# Patient Record
Sex: FEMALE | Race: WHITE | NOT HISPANIC OR LATINO | Employment: OTHER | URBAN - METROPOLITAN AREA
[De-identification: names, ages, dates, MRNs, and addresses within clinical notes are randomized per-mention and may not be internally consistent; named-entity substitution may affect disease eponyms.]

---

## 2017-01-03 ENCOUNTER — OFFICE VISIT (OUTPATIENT)
Dept: MATERNAL FETAL MEDICINE | Facility: CLINIC | Age: 27
End: 2017-01-03
Payer: OTHER GOVERNMENT

## 2017-01-03 DIAGNOSIS — Z36.89 ENCOUNTER FOR ULTRASOUND TO CHECK FETAL GROWTH: ICD-10-CM

## 2017-01-03 PROCEDURE — 99212 OFFICE O/P EST SF 10 MIN: CPT | Mod: 25,GT,S$PBB, | Performed by: OBSTETRICS & GYNECOLOGY

## 2017-01-03 PROCEDURE — 76816 OB US FOLLOW-UP PER FETUS: CPT | Mod: 26,S$PBB,, | Performed by: OBSTETRICS & GYNECOLOGY

## 2017-01-03 PROCEDURE — 76817 TRANSVAGINAL US OBSTETRIC: CPT | Mod: 26,S$PBB,, | Performed by: OBSTETRICS & GYNECOLOGY

## 2017-01-03 NOTE — PROGRESS NOTES
Indication: follow up growth/cervix (Dr. Romi Baer).   Recurrent  delivery.   ____________________________________________________________________________  History: Age: 26 years. : 3 Para: 2. Previous pregnancies: Children born : 2. Living children: 2.   Obstetric History: Mode of last delivery: Vaginal Delivery.    Details on previous pregnancies: Living children <37W: 2.  ____________________________________________________________________________  Dating:  Best Overall Assessment: 10/10/16 EDC: 17 Assessed GA: 25w1d  The Best Overall Assessment is based on the ultrasound examination on 10/10/16.  The calculation of the gestational age was then based on  CRL.  ____________________________________________________________________________  General Evaluation:  Fetal heart activity: present. Fetal heart rate: 152 bpm.   Presentation: cephalic.   Fetal movement: visible.   Amniotic Fluid: normal. Maximal vertical pocket 7.3 cm.   Cord: 3 vessels.   Placenta: posterior.     ____________________________________________________________________________  Growth Scan:  [place]ahan gestation.  Biometry:  BPD 62.9 mm 54th% 25w3d (24w5d to 26w2d)  .0 mm 35th% 25w2d (23w2d to 27w3d)  .0 mm 60th% 25w5d (25w0d to 26w3d)  FL 44.9 mm 27th% 24w6d (22w5d to 26w6d)  OFD 80.8 mm 39th% 24w6d  TCD 27.9 mm 63rd% 25w4d  HUM 42.3 mm 50th% 25w1d  RLV 5.7 mm  CM 6.6 mm 64th%  EFW (lbs/oz) 1 lbs 12 ozs  EFW (g) 799 g  48th%       Fetal Anatomy:  Visualized with normal appearance: head, brain, chest, gastrointestinal tract, kidneys, bladder.  Not examined: neck, skin.  Face: profile normal, nose normal, lip normal.  Spine: sub-opt today but prev seen wnl.  Heart: Visualized and normal appearance: four-chamber view, left outflow tract, right outflow tract.   Angle: to the fetal left. Four-chamber view: septum is sub-opt, prev seen wnl. Aortic arch: Not ascertained.   Aorta suboptimal but prev seen  "wnl.  Abdominal Wall: Sub-opt today but prev seen wnl.  Genitalia: sub-opt today but prev seen girl.   Extremities: 4 limbs. Sub-opt today on plantar surface of the feet, prev seen wnl; open hands seen.    Summary of Ultrasound Findings:  Transabdominal and transvaginal US. U/S machine: PhilipsEPNSC 7W. U/S view: limited by fetal position, limited by fetal movements.     ____________________________________________________________________________  Consultation:  FUV: Recurrent PTD     26  ARVIN 17; 24w2d    Prenatal record and OB Hx reviewed  Dr. Hopper's last note reviewed  Maternal Screen wnl  Pt interviewed and examined  Ultrasound done  Interval pregnancy problems - feeling "more and more ctxs"  No leaking, bleeding or abnormal discharge  on weekly 17-P    EPIC reviewed    OB HX   - Faviola; 4-13;  at 34w; NICU x 6w;   pt had bleeding and multiple admissions; reports CL was 4 mm and pt was 6 cm the day before delivery   - Melanie; 5-6;  at 36 weeks; was on Vaginal progesterone; pt reports "ctxs at 12 weeks" and CL around 12 mm at 24 weeks; she was later induced after P-PROM; 9 hour labor  She had P-PROM "1-2 days after stopping progesterone"    Thyroid Disorder  Pt reports "hypothyroidism" after last delivery  Was not treated.  ____________________________________________________________________________  Maternal Structures:  Cervix: no funneling.   Cervical length: 26.1 mm  Cervical length with fundal pressure: 27 mm.  ____________________________________________________________________________  Report Summary:  Impression:   Normal fetal growth,   Normal Amniotic fluid volume,   Normal placental location,   Cervical length is 26 mm but still wnl  This couple are anxious about "delivering as soon as they stop progesterone" and would like to continue their weekly 17-P until 37 weeks.     Recommendations:   Consider more frequent prenatal visits, to assess for signs and symptoms of " PTL  Advised pt to remain well rested, well nourished and well hydrated  We have no objection to an extra week of 17-P  Re-consult if clinical condition changes

## 2017-03-13 ENCOUNTER — HOSPITAL ENCOUNTER (INPATIENT)
Facility: OTHER | Age: 27
LOS: 8 days | Discharge: HOME | End: 2017-03-21
Attending: OBSTETRICS & GYNECOLOGY | Admitting: OBSTETRICS & GYNECOLOGY
Payer: OTHER GOVERNMENT

## 2017-03-13 DIAGNOSIS — O47.03 THREATENED PRETERM LABOR, THIRD TRIMESTER: Primary | ICD-10-CM

## 2017-03-13 PROBLEM — O47.00 THREATENED PRETERM LABOR: Status: ACTIVE | Noted: 2017-03-13

## 2017-03-13 LAB
ABO + RH BLD: NORMAL
BASOPHILS # BLD AUTO: 0.01 K/UL
BASOPHILS NFR BLD: 0.1 %
BLD GP AB SCN CELLS X3 SERPL QL: NORMAL
DIFFERENTIAL METHOD: ABNORMAL
EOSINOPHIL # BLD AUTO: 0.2 K/UL
EOSINOPHIL NFR BLD: 1.2 %
ERYTHROCYTE [DISTWIDTH] IN BLOOD BY AUTOMATED COUNT: 12.6 %
HCT VFR BLD AUTO: 34.2 %
HGB BLD-MCNC: 10.9 G/DL
LYMPHOCYTES # BLD AUTO: 1.8 K/UL
LYMPHOCYTES NFR BLD: 11.3 %
MCH RBC QN AUTO: 28.9 PG
MCHC RBC AUTO-ENTMCNC: 31.9 %
MCV RBC AUTO: 91 FL
MONOCYTES # BLD AUTO: 1 K/UL
MONOCYTES NFR BLD: 6.6 %
NEUTROPHILS # BLD AUTO: 12.6 K/UL
NEUTROPHILS NFR BLD: 80.2 %
PLATELET # BLD AUTO: 278 K/UL
PMV BLD AUTO: 9.1 FL
RBC # BLD AUTO: 3.77 M/UL
WBC # BLD AUTO: 15.67 K/UL

## 2017-03-13 PROCEDURE — 99223 1ST HOSP IP/OBS HIGH 75: CPT | Mod: 25,,, | Performed by: OBSTETRICS & GYNECOLOGY

## 2017-03-13 PROCEDURE — 59025 FETAL NON-STRESS TEST: CPT | Mod: 26,,, | Performed by: OBSTETRICS & GYNECOLOGY

## 2017-03-13 PROCEDURE — 25000003 PHARM REV CODE 250: Performed by: STUDENT IN AN ORGANIZED HEALTH CARE EDUCATION/TRAINING PROGRAM

## 2017-03-13 PROCEDURE — 99285 EMERGENCY DEPT VISIT HI MDM: CPT | Mod: 25

## 2017-03-13 PROCEDURE — 59025 FETAL NON-STRESS TEST: CPT

## 2017-03-13 PROCEDURE — 99283 EMERGENCY DEPT VISIT LOW MDM: CPT | Mod: 25,,, | Performed by: OBSTETRICS & GYNECOLOGY

## 2017-03-13 PROCEDURE — 76816 OB US FOLLOW-UP PER FETUS: CPT | Mod: 26,,, | Performed by: OBSTETRICS & GYNECOLOGY

## 2017-03-13 PROCEDURE — 86850 RBC ANTIBODY SCREEN: CPT

## 2017-03-13 PROCEDURE — 11000001 HC ACUTE MED/SURG PRIVATE ROOM

## 2017-03-13 PROCEDURE — 85025 COMPLETE CBC W/AUTO DIFF WBC: CPT

## 2017-03-13 PROCEDURE — 86900 BLOOD TYPING SEROLOGIC ABO: CPT

## 2017-03-13 RX ORDER — SIMETHICONE 80 MG
1 TABLET,CHEWABLE ORAL EVERY 6 HOURS PRN
Status: DISCONTINUED | OUTPATIENT
Start: 2017-03-13 | End: 2017-03-21 | Stop reason: HOSPADM

## 2017-03-13 RX ORDER — PRENATAL WITH FERROUS FUM AND FOLIC ACID 3080; 920; 120; 400; 22; 1.84; 3; 20; 10; 1; 12; 200; 27; 25; 2 [IU]/1; [IU]/1; MG/1; [IU]/1; MG/1; MG/1; MG/1; MG/1; MG/1; MG/1; UG/1; MG/1; MG/1; MG/1; MG/1
1 TABLET ORAL DAILY
Status: DISCONTINUED | OUTPATIENT
Start: 2017-03-14 | End: 2017-03-21 | Stop reason: HOSPADM

## 2017-03-13 RX ORDER — DIPHENHYDRAMINE HCL 25 MG
25 CAPSULE ORAL EVERY 4 HOURS PRN
Status: DISCONTINUED | OUTPATIENT
Start: 2017-03-13 | End: 2017-03-21 | Stop reason: HOSPADM

## 2017-03-13 RX ORDER — AMOXICILLIN 250 MG
1 CAPSULE ORAL NIGHTLY PRN
Status: DISCONTINUED | OUTPATIENT
Start: 2017-03-13 | End: 2017-03-21 | Stop reason: HOSPADM

## 2017-03-13 RX ORDER — DIPHENHYDRAMINE HYDROCHLORIDE 50 MG/ML
25 INJECTION INTRAMUSCULAR; INTRAVENOUS EVERY 4 HOURS PRN
Status: DISCONTINUED | OUTPATIENT
Start: 2017-03-13 | End: 2017-03-21 | Stop reason: HOSPADM

## 2017-03-13 RX ORDER — SODIUM CHLORIDE, SODIUM LACTATE, POTASSIUM CHLORIDE, CALCIUM CHLORIDE 600; 310; 30; 20 MG/100ML; MG/100ML; MG/100ML; MG/100ML
INJECTION, SOLUTION INTRAVENOUS CONTINUOUS
Status: DISCONTINUED | OUTPATIENT
Start: 2017-03-13 | End: 2017-03-14

## 2017-03-13 RX ORDER — ONDANSETRON 8 MG/1
8 TABLET, ORALLY DISINTEGRATING ORAL EVERY 8 HOURS PRN
Status: DISCONTINUED | OUTPATIENT
Start: 2017-03-13 | End: 2017-03-21 | Stop reason: HOSPADM

## 2017-03-13 RX ADMIN — SODIUM CHLORIDE, SODIUM LACTATE, POTASSIUM CHLORIDE, AND CALCIUM CHLORIDE: .6; .31; .03; .02 INJECTION, SOLUTION INTRAVENOUS at 05:03

## 2017-03-13 NOTE — IP AVS SNAPSHOT
[facility]  [address] [place], [place] [address]  Phone: [phone]           Patient Discharge Instructions     Our goal is to set you up for success. This packet includes information on your condition, medications, and your home care. It will help you to care for yourself so you don't get sicker and need to go back to the hospital.     Please ask your nurse if you have any questions.        There are many details to remember when preparing to leave the hospital. Here is what you will need to do:    1. Take your medicine. If you are prescribed medications, review your Medication List in the following pages. You may have new medications to  at the pharmacy and others that you'll need to stop taking. Review the instructions for how and when to take your medications. Talk with your doctor or nurses if you are unsure of what to do.     2. Go to your follow-up appointments. Specific follow-up information is listed in the following pages. Your may be contacted by a transition nurse or clinical provider about future appointments. Be sure we have all of the phone numbers to reach you, if needed. Please contact your provider's office if you are unable to make an appointment.     3. Watch for warning signs. Your doctor or nurse will give you detailed warning signs to watch for and when to call for assistance. These instructions may also include educational information about your condition. If you experience any of warning signs to your health, call your doctor.               [facility]  Unless otherwise directed by your provider, please contact [facility], our nurse care line that is available for 24/7 assistance.     [phone] (toll-free)    Registered nurses in the [facility] provide clinical advisement, health education, appointment booking, and other advisory services.                    ** Verify the list of medication(s) below is accurate and up to  date. Carry this with you in case of emergency. If your medications have changed, please notify your healthcare provider.             Medication List      CONTINUE taking these medications        Additional Info                      ondansetron 4 MG tablet   Commonly known as:  ZOFRAN   Refills:  0      Begin Date    AM    Noon    PM    Bedtime       PRENATAL MULTIVITAMINS ORAL   Refills:  0   Dose:  1 capsule    Instructions:  Take 1 capsule by mouth.     Begin Date    AM    Noon    PM    Bedtime       UNISOM (DOXYLAMINE) 25 mg tablet   Refills:  0   Dose:  1 capsule   Generic drug:  doxylamine succinate    Instructions:  Take 1 capsule by mouth daily as needed.     Begin Date    AM    Noon    PM    Bedtime       VITAMIN B-6 50 MG Tab   Refills:  0   Generic drug:  pyridoxine (vitamin B6)      Begin Date    AM    Noon    PM    Bedtime                  Please bring to all follow up appointments:    1. A copy of your discharge instructions.  2. All medicines you are currently taking in their original bottles.  3. Identification and insurance card.    Please arrive 15 minutes ahead of scheduled appointment time.    Please call 24 hours in advance if you must reschedule your appointment and/or time.        Follow-up Information     Follow up with [facility]. Schedule an appointment as soon as possible for a visit in 1 week.    Why:  Prenatal appointments    Contact information:    [address] [facility] [place] [address] [phone]          Discharge Instructions     Future Orders    Activity as tolerated     Call MD for:  difficulty breathing or increased cough     Call MD for:  persistent dizziness, light-headedness, or visual disturbances     Call MD for:  persistent nausea and vomiting or diarrhea     Call MD for:  redness, tenderness, or signs of infection (pain, swelling, redness, odor or green/yellow discharge around incision site)     Call MD for:  severe persistent headache     Call MD for:   "severe uncontrolled pain     Call MD for:  temperature >100.4     Diet general     Questions:    Total calories:      Fat restriction, if any:      Protein restriction, if any:      Na restriction, if any:      Fluid restriction:      Additional restrictions:          Primary Diagnosis     Your primary diagnosis was:  Threatened Premature Labor      Admission Information     Date & Time Provider Department CSN    3/13/2017  3:53 PM Mary Colorado MD [facility] [id number]      Care Providers     Provider Role Specialty Primary office phone    Mary Colorado MD Attending Provider Maternal and Fetal Medicine [phone]      Your Vitals Were     BP Pulse Temp Resp Height Weight    106/70 (BP Location: Right arm, Patient Position: Sitting, BP Method: Automatic) 91 96.6 °F (35.9 °C) 18 5' 4" (1.626 m) 67.2 kg (148 lb 1.7 oz)    SpO2 BMI             95% 25.42 kg/m2         Recent Lab Values     No lab values to display.      Allergies as of 3/21/2017        Reactions    Chlorhexidine Rash    Latex, Natural Rubber Rash    Morphine Rash      Advance Directives     An advance directive is a document which, in the event you are no longer able to make decisions for yourself, tells your healthcare team what kind of treatment you do or do not want to receive, or who you would like to make those decisions for you.  If you do not currently have an advance directive, [facility] encourages you to create one.  For more information call:  [phone]), [phone] ([phone]),  or log on to [email or web address].        Language Assistance Services     ATTENTION: Language assistance services are available, free of charge. Please call [phone].      ATENCIÓN: Si habla español, tiene a rivera disposición servicios gratuitos de asistencia lingüística. Llame al [phone].     DAVE Ý: N?u b?n nói Ti?ng Vi?t, có các d?ch v? h? tr? ngôn ng? mi?n phí dành cho b?n. G?i s? [phone].      "   [facility] Sign-Up     Activating your [facility] account is as easy as 1-2-3!     1) Visit [email or web address], select Sign Up Now, enter this activation code and your date of birth, then select Next.  [email or web address]  Expires: 5/5/2017 10:02 AM      2) Create a username and password to use when you visit [facility] in the future and select a security question in case you lose your password and select Next.    3) Enter your e-mail address and click Sign Up!    Additional Information  If you have questions, please e-mail [email or web address] or call [phone] to talk to our [facility] staff. Remember, [facility] is NOT to be used for urgent needs. For medical emergencies, dial 911.          [facility] complies with applicable Federal civil rights laws and does not discriminate on the basis of race, color, national origin, age, disability, or sex.

## 2017-03-13 NOTE — LETTER
March 21, 2017    Abby Oneill  [address]  [place] [address]            [address] [facility] [address]  Phone: [phone] March 21, 2017     Patient: Abby Oneill   YOB: 1990   Date of Visit: 3/13/2017       To Whom It May Concern:    It is my medical opinion that Abby Oneill should remain out of work for the remainder of her pregnancy.  Her advanced cervical dilation of 6cm and history of rapid labor upon rupture of membranes does not make her a candidate for resuming work duties.    If you have any questions or concerns, please don't hesitate to call.    Sincerely,            Joey Yun MD

## 2017-03-13 NOTE — PROGRESS NOTES
Pt brought from [facility] to antepartum for continuous monitoring. Pt reports +FM & denies LOF. She is feeling ctx every 5 minutes. No cervical change during transfer 4/60/-3. Magnesium was stopped upon arrival, tay removed. VSS.

## 2017-03-13 NOTE — H&P
HPI Comments: Abby Oneill is a 26 y.o. [id number] at 3wks gestation (ARVIN = 2017 per 13 wk gestation) presents complaining of contractions that began early this morning. States she initially believed they were [place] Campos ctx but they have become more intense and more frequent. She was seen by her primary OB this morning where she was noted to be 3cm dilated, per patient. She was sent to L&D at her hospital at which time she was noted to be 4/50/-1 and ken regularly on toco. OSH started magnesium bolus and initiated transfer to [facility]. She received BMZ series 3/7, 3/8.   This IUP is complicated by h/o PTD x 2 (34w2d, 36w1d) on weekly progesterone and hypothyroidism (no medication). Patient denies vaginal bleeding, denies LOF. Fetal Movement: normal.          Past Medical History:   Diagnosis Date    H/O hemorrhoidectomy     Hypothyroid              Past Surgical History:   Procedure Laterality Date    INGUINAL HERNIA REPAIR         No family history on file.  Social History   Substance Use Topics    Smoking status: Never Smoker    Smokeless tobacco: Never Used    Alcohol use No      Review of Systems   Constitutional: Negative for chills and fever.   HENT: Negative for congestion.   Respiratory: Negative for shortness of breath.   Gastrointestinal: Negative for nausea and vomiting.   Genitourinary: Negative for vaginal bleeding.   Skin: Negative for wound.   Neurological: Negative for headaches.                 Physical Exam          Initial Vitals   BP Pulse Resp Temp SpO2   -- -- -- -- --    115/69 87  16  97.3  99%       Physical Exam  Constitutional: She appears well-developed and well-nourished.   Eyes: EOM are normal.   Neck: Normal range of motion. Neck supple.   Cardiovascular: Normal rate and regular rhythm.   Pulmonary/Chest: No respiratory distress.   Abdominal: Soft. She exhibits no distension. There is no tenderness.   Musculoskeletal: Normal range of motion. She  exhibits no edema.   Neurological: She is alert and oriented to person, place, and time.   OB LABOR EXAM:      Membranes ruptured: No.   Method: Sterile vaginal exam per MD.         Dilatation: 4.   Station: -2.   Amniotic Fluid Color: no fluid.       Olguin score: 7     ED Course   Procedures  Labs Reviewed - No data to display                             ED Course      Clinical Impression:   The encounter diagnosis was Threatened  labor, third trimester.      - admit to antepartum  - consents for blood transfusion, c/s, and vaginal delivery signed to chart  - draw type and screen, CBC  - GBS negative  - cephalic position  - s/p BMZ for fetal lung maturity  - continuous monitoring/toco  - NPO  - recheck prn    Casie Conde MD  OB/GYN, PGY-1

## 2017-03-13 NOTE — ED PROVIDER NOTES
Encounter Date: 3/13/2017       History     Chief Complaint   Patient presents with    Contractions     Review of patient's allergies indicates:   Allergen Reactions    Chlorhexidine Rash    Latex, natural rubber Rash    Morphine Rash     HPI Comments: Abby Oneill is a 26 y.o. [id number] at 34w1d presents complaining of contractions that began early this morning. States she initially believed they were [place] Campos ctx but they have become more intense and more frequent. She was seen by her primary OB this morning where she was noted to be 3cm dilated, per patient. She was sent to L&D at her hospital at which time she was noted to be 4/50/-1 and ken regularly on toco. OSH started magnesium bolus and initiated transfer to [facility]. She received BMZ series 3/7, 3/8.   This IUP is complicated by h/o PTD x 2 (34w2d, 36w1d) on weekly progesterone and hypothyroidism (no medication).  Patient denies vaginal bleeding, denies LOF.   Fetal Movement: normal.      Past Medical History:   Diagnosis Date    H/O hemorrhoidectomy     Hypothyroid      Past Surgical History:   Procedure Laterality Date    INGUINAL HERNIA REPAIR       No family history on file.  Social History   Substance Use Topics    Smoking status: Never Smoker    Smokeless tobacco: Never Used    Alcohol use No     Review of Systems   Constitutional: Negative for chills and fever.   HENT: Negative for congestion.    Respiratory: Negative for shortness of breath.    Gastrointestinal: Negative for nausea and vomiting.   Genitourinary: Negative for vaginal bleeding.   Skin: Negative for wound.   Neurological: Negative for headaches.       Physical Exam   Initial Vitals   BP Pulse Resp Temp SpO2   -- -- -- -- --            Physical Exam    Constitutional: She appears well-developed and well-nourished.   Eyes: EOM are normal.   Neck: Normal range of motion. Neck supple.   Cardiovascular: Normal rate and regular rhythm.   Pulmonary/Chest:  No respiratory distress.   Abdominal: Soft. She exhibits no distension. There is no tenderness.   Musculoskeletal: Normal range of motion. She exhibits no edema.   Neurological: She is alert and oriented to person, place, and time.     OB LABOR EXAM:     Membranes ruptured: No.   Method: Sterile vaginal exam per MD.       Dilatation: 4.   Station: -2.   Amniotic Fluid Color: no fluid.           ED Course   Procedures  Labs Reviewed - No data to display                       Attending Attestation:   Physician Attestation Statement for Resident:  As the supervising MD   Physician Attestation Statement: I have personally seen and examined this patient.   I agree with the above history. -:   As the supervising MD I agree with the above PE.    As the supervising MD I agree with the above treatment, course, plan, and disposition.   -:   NST  I independently reviewed the fetal non-stress test with the following interpretation:  135 BPM baseline  Variability: moderate  Accelerations: present  Decelerations: absent  Contractions: Q 5-7 min  Category 1    Clinical Interpretation: reactive    Patient evaluated and found to be stable, agree with resident's assessment and plan to admit for management of pre-term labor.  I was personally present during the critical portions of the procedure(s) performed by the resident and was immediately available in the ED to provide services and assistance as needed during the entire procedure.  I have reviewed the following: old records at this facility.                    ED Course     Clinical Impression:   The encounter diagnosis was Threatened  labor, third trimester.     - admit to antepartum  - consents for blood transfusion, c/s, and vaginal delivery signed to chart  - draw type and screen, CBC  - GBS negative  - cephalic position  - s/p BMZ for fetal lung maturity  - continuous monitoring/toco  - NPO  - recheck na Conde MD  Resident  [phone]        Rose Marie Jones MD  03/20/17 [id number]

## 2017-03-14 PROCEDURE — 25000003 PHARM REV CODE 250: Performed by: STUDENT IN AN ORGANIZED HEALTH CARE EDUCATION/TRAINING PROGRAM

## 2017-03-14 PROCEDURE — 59025 FETAL NON-STRESS TEST: CPT

## 2017-03-14 PROCEDURE — 99232 SBSQ HOSP IP/OBS MODERATE 35: CPT | Mod: 25,,, | Performed by: OBSTETRICS & GYNECOLOGY

## 2017-03-14 PROCEDURE — 11000001 HC ACUTE MED/SURG PRIVATE ROOM

## 2017-03-14 PROCEDURE — 59025 FETAL NON-STRESS TEST: CPT | Mod: 26,,, | Performed by: OBSTETRICS & GYNECOLOGY

## 2017-03-14 RX ADMIN — SODIUM CHLORIDE, SODIUM LACTATE, POTASSIUM CHLORIDE, AND CALCIUM CHLORIDE: .6; .31; .03; .02 INJECTION, SOLUTION INTRAVENOUS at 01:03

## 2017-03-14 RX ADMIN — Medication 1 EACH: at 09:03

## 2017-03-14 NOTE — ASSESSMENT & PLAN NOTE
-Cervix unchanged from yesterday evening.  Contractions spacing out.  Will plan to deescalate today and continue to monitor  -S/p BMZ 3/7-3/8

## 2017-03-14 NOTE — PROGRESS NOTES
[facility]  Maternal & Fetal Medicine  Progress Note    Patient Name: Abby Oneill  MRN: [id number]  Code Status: Full Code   Admission Date: 3/13/2017  Length of Stay: 1 days  Attending Physician: Mary Colorado MD  Primary Care Provider: [facility]    Subjective:     HPI:  Abby Oneill is a 26 y.o. [id number] at 34w2d gestation (ARVIN = 2017 per 13 wk gestation) who presented complaining of contractions that began early yesterday morning. States she initially believed they were [place] Campos ctx but they have become more intense and more frequent. She was seen by her primary OB yesterday morning where she was noted to be 3cm dilated, per patient. She was sent to L&D at her hospital at which time she was noted to be 4/50/-1 and ken regularly on toco. OSH started magnesium bolus and initiated transfer to [facility]. She received BMZ series 3/7, 3/8.   This IUP is complicated by h/o PTD x 2 (34w2d, 36w1d) on weekly progesterone and hypothyroidism (no medication).      Interval History: This morning the patient reports feeling less frequent contractions than at admit.  Denies LOF and VB.  Good FM.    Review of patient's allergies indicates:   Allergen Reactions    Chlorhexidine Rash    Latex, natural rubber Rash    Morphine Rash       Objective:     Vital Signs (Most Recent):  Temp: 97.2 °F (36.2 °C) (17 0430)  Pulse: 99 (17 0620)  Resp: 14 (17 0430)  BP: 113/65 (17 0620)  SpO2: 98 % (17 0620) Vital Signs (24h Range):  Temp:  [97.2 °F (36.2 °C)-98 °F (36.7 °C)] 97.2 °F (36.2 °C)  Pulse:  [] 99  Resp:  [14-18] 14  SpO2:  [96 %-100 %] 98 %  BP: ()/(56-69) 113/65       Intake/Output Summary (Last 24 hours) at 17 0928  Last data filed at 17 1720   Gross per 24 hour   Intake                0 ml   Output             1200 ml   Net            -1200 ml       Physical Exam:   Constitutional: She appears well-developed and  well-nourished.    HENT:   Head: Normocephalic and atraumatic.    Eyes: Conjunctivae are normal.     Cardiovascular: Normal heart sounds.     Pulmonary/Chest: Effort normal.        Abdominal: Soft. She exhibits no distension and no mass. There is no tenderness. There is no rebound and no guarding. No hernia.     Genitourinary:   Genitourinary Comments: /-2 (unchanged from last night)                Skin: Skin is warm and dry.        FHT/NST: 135, mod btbv, +accels/-decels Cat 1 (reassuring)                 TOCO: Q 5-10 minutes       Significant Labs: No results found for this or any previous visit (from the past 12 hour(s)).       Assessment/Plan:   34w2d weeks gestation presents for:    Threatened  labor  -Cervix unchanged from yesterday evening.  Contractions spacing out.  Will plan to deescalate today and continue to monitor  -S/p BMZ 3/7-3/8      Joey Yun MD  Maternal & Fetal Medicine  [facility]

## 2017-03-14 NOTE — SUBJECTIVE & OBJECTIVE
Interval History: This morning the patient reports feeling less frequent contractions than at admit.  Denies LOF and VB.  Good FM.    Review of patient's allergies indicates:   Allergen Reactions    Chlorhexidine Rash    Latex, natural rubber Rash    Morphine Rash       Objective:     Vital Signs (Most Recent):  Temp: 97.2 °F (36.2 °C) (03/14/17 0430)  Pulse: 99 (03/14/17 0620)  Resp: 14 (03/14/17 0430)  BP: 113/65 (03/14/17 0620)  SpO2: 98 % (03/14/17 0620) Vital Signs (24h Range):  Temp:  [97.2 °F (36.2 °C)-98 °F (36.7 °C)] 97.2 °F (36.2 °C)  Pulse:  [] 99  Resp:  [14-18] 14  SpO2:  [96 %-100 %] 98 %  BP: ()/(56-69) 113/65       Intake/Output Summary (Last 24 hours) at 03/14/17 0928  Last data filed at 03/13/17 1720   Gross per 24 hour   Intake                0 ml   Output             1200 ml   Net            -1200 ml       Physical Exam:   Constitutional: She appears well-developed and well-nourished.    HENT:   Head: Normocephalic and atraumatic.    Eyes: Conjunctivae are normal.     Cardiovascular: Normal heart sounds.     Pulmonary/Chest: Effort normal.        Abdominal: Soft. She exhibits no distension and no mass. There is no tenderness. There is no rebound and no guarding. No hernia.     Genitourinary:   Genitourinary Comments: 5/70/-2 (unchanged from last night)                Skin: Skin is warm and dry.        FHT/NST: 135, mod btbv, +accels/-decels Cat 1 (reassuring)                 TOCO: Q 5-10 minutes       Significant Labs: No results found for this or any previous visit (from the past 12 hour(s)).

## 2017-03-14 NOTE — PROGRESS NOTES
MD to bedside for c/o increased pain/intensity with contractions.     FHT: 135 bpm, Cat 1, reassuring  TOCO: q7-10 mins    SVE: 5/70/-2    27 yo  with threatened  labor  - cervical change noted on exam  - tylenol offered, patient declined. Notes that she would like epidural with this delivery if possible.  - given patient's history of rapid progression of labor and delivery, will move to L&D floor. Charge RN notified.  - NPO/continuous  - recheck PRN, reasons to call out reviewed    Dominique Sky MD  OBGYN - PGY 2

## 2017-03-15 PROCEDURE — 11000001 HC ACUTE MED/SURG PRIVATE ROOM

## 2017-03-15 PROCEDURE — 99232 SBSQ HOSP IP/OBS MODERATE 35: CPT | Mod: 25,,, | Performed by: OBSTETRICS & GYNECOLOGY

## 2017-03-15 PROCEDURE — 59025 FETAL NON-STRESS TEST: CPT | Mod: 26,,, | Performed by: OBSTETRICS & GYNECOLOGY

## 2017-03-15 PROCEDURE — 25000003 PHARM REV CODE 250: Performed by: STUDENT IN AN ORGANIZED HEALTH CARE EDUCATION/TRAINING PROGRAM

## 2017-03-15 RX ADMIN — Medication 1 EACH: at 08:03

## 2017-03-15 NOTE — PROGRESS NOTES
[facility]  Maternal & Fetal Medicine  Progress Note    Patient Name: Abby Oneill  MRN: [id number]  Code Status: Full Code   Admission Date: 3/13/2017  Length of Stay: 2 days  Attending Physician: Mary Colorado MD  Primary Care Provider: [facility]    Subjective:     HPI:  Abby Oneill is a 26 y.o. [id number] admitted at 34w2d gestation (ARVIN = 2017 per 13 wk gestation) who presented complaining of contractions that began early yesterday morning. States she initially believed they were Artem Campos ctx but they have become more intense and more frequent. She was seen by her primary OB yesterday morning where she was noted to be 3cm dilated, per patient. She was sent to L&D at her hospital at which time she was noted to be 4/50/-1 and ken regularly on toco. OSH started magnesium bolus and initiated transfer to [facility]. She received BMZ series 3/7, 3/8.   This IUP is complicated by h/o PTD x 2 (34w2d, 36w1d) on weekly progesterone and hypothyroidism (no medication).      Interval History: No acute events overnight.  Patient denies any ctx overnight nor this morning.  She is doing well.  Tolerating a PO diet without n/v.  Patient reports good FM.  She denies ctx, vb and lof.        Review of patient's allergies indicates:   Allergen Reactions    Chlorhexidine Rash    Latex, natural rubber Rash    Morphine Rash       Objective:     Vital Signs (Most Recent):  Temp: 96.4 °F (35.8 °C) (03/15/17 0409)  Pulse: 98 (03/15/17 0409)  Resp: 16 (03/15/17 040)  BP: (!) 101/51 (03/15/17 0409)  SpO2: 95 % (03/15/17 0409) Vital Signs (24h Range):  Temp:  [96.4 °F (35.8 °C)-98.1 °F (36.7 °C)] 96.4 °F (35.8 °C)  Pulse:  [] 98  Resp:  [16-18] 16  SpO2:  [95 %-100 %] 95 %  BP: ()/(51-69) 101/51       Intake/Output Summary (Last 24 hours) at 03/15/17 0625  Last data filed at 17 1900   Gross per 24 hour   Intake             1560 ml   Output             1155 ml    Net              405 ml       Physical Exam:   Constitutional: She appears well-developed and well-nourished.    HENT:   Head: Normocephalic and atraumatic.    Eyes: Conjunctivae are normal.     Cardiovascular: Normal heart sounds.     Pulmonary/Chest: Effort normal.        Abdominal: Soft. She exhibits no distension and no mass. There is no tenderness. There is no rebound and no guarding. No hernia.                  Skin: Skin is warm and dry.        FHT/NST: 135, mod btbv, +accels/-decels, reactive, reassuring                  TOCO: One in 20 min       Significant Labs: No results found for this or any previous visit (from the past 12 hour(s)).       Assessment/Plan:   34w3d weeks gestation presents for:    Prior  labor in third trimester, antepartum  Karrie today    Threatened  labor  -No ctx yesterday nor this AM.  Will plan on possible d/c later today   -S/p BMZ 3/7-3/8      Joey Yun MD  Maternal & Fetal Medicine  [facility]

## 2017-03-15 NOTE — PLAN OF CARE
Problem:  Labor (Adult,Obstetrics,Pediatric)  Goal: Signs and Symptoms of Listed Potential Problems Will be Absent, Minimized or Managed ( Labor)  Signs and symptoms of listed potential problems will be absent, minimized or managed by discharge/transition of care (reference  Labor (Adult,Obstetrics,Pediatric) CPG).   Outcome: Ongoing (interventions implemented as appropriate)  Patient denies contractions, LOF, VB, states positive FM. VSS.

## 2017-03-15 NOTE — SUBJECTIVE & OBJECTIVE
Interval History: No acute events overnight.  Patient denies any ctx overnight nor this morning.  She is doing well.  Tolerating a PO diet without n/v.  Patient reports good FM.  She denies ctx, vb and lof.        Review of patient's allergies indicates:   Allergen Reactions    Chlorhexidine Rash    Latex, natural rubber Rash    Morphine Rash       Objective:     Vital Signs (Most Recent):  Temp: 96.4 °F (35.8 °C) (03/15/17 0409)  Pulse: 98 (03/15/17 0409)  Resp: 16 (03/15/17 0409)  BP: (!) 101/51 (03/15/17 0409)  SpO2: 95 % (03/15/17 0409) Vital Signs (24h Range):  Temp:  [96.4 °F (35.8 °C)-98.1 °F (36.7 °C)] 96.4 °F (35.8 °C)  Pulse:  [] 98  Resp:  [16-18] 16  SpO2:  [95 %-100 %] 95 %  BP: ()/(51-69) 101/51       Intake/Output Summary (Last 24 hours) at 03/15/17 0625  Last data filed at 03/14/17 1900   Gross per 24 hour   Intake             1560 ml   Output             1155 ml   Net              405 ml       Physical Exam:   Constitutional: She appears well-developed and well-nourished.    HENT:   Head: Normocephalic and atraumatic.    Eyes: Conjunctivae are normal.     Cardiovascular: Normal heart sounds.     Pulmonary/Chest: Effort normal.        Abdominal: Soft. She exhibits no distension and no mass. There is no tenderness. There is no rebound and no guarding. No hernia.                  Skin: Skin is warm and dry.        FHT/NST: 135, mod btbv, +accels/-decels, reactive, reassuring                  TOCO: One in 20 min       Significant Labs: No results found for this or any previous visit (from the past 12 hour(s)).

## 2017-03-15 NOTE — PLAN OF CARE
Problem: Patient Care Overview  Goal: Plan of Care Review  Outcome: Ongoing (interventions implemented as appropriate)  Pt denies contraction pain, just tightness occasionally, VSS, afebrile, denies h/a, n/v, dizziness, strip is reactive with accels and no decels, +FM, denies LOF and VB, bed at lowest position, call light within reach, side rails up x2, pt has no further questions, comments, or concerns at this time, report given to night shift.

## 2017-03-16 PROCEDURE — 59025 FETAL NON-STRESS TEST: CPT | Mod: 26,,, | Performed by: OBSTETRICS & GYNECOLOGY

## 2017-03-16 PROCEDURE — 11000001 HC ACUTE MED/SURG PRIVATE ROOM

## 2017-03-16 PROCEDURE — 99231 SBSQ HOSP IP/OBS SF/LOW 25: CPT | Mod: 25,,, | Performed by: OBSTETRICS & GYNECOLOGY

## 2017-03-16 PROCEDURE — 25000003 PHARM REV CODE 250: Performed by: STUDENT IN AN ORGANIZED HEALTH CARE EDUCATION/TRAINING PROGRAM

## 2017-03-16 RX ORDER — HYDROXYPROGESTERONE CAPROATE 250 MG/ML
250 INJECTION INTRAMUSCULAR
Status: DISCONTINUED | OUTPATIENT
Start: 2017-03-20 | End: 2017-03-21 | Stop reason: HOSPADM

## 2017-03-16 RX ADMIN — Medication 1 EACH: at 09:03

## 2017-03-16 NOTE — PROGRESS NOTES
[facility]  Maternal & Fetal Medicine  Progress Note    Patient Name: Abby Oneill  MRN: [id number]  Code Status: Full Code   Admission Date: 3/13/2017  Length of Stay: 3 days  Attending Physician: Mary Colorado MD  Primary Care Provider: [facility]    Subjective:     HPI:  Abby Oneill is a 26 y.o. [id number] admitted at 34w2d gestation (RAVIN = 2017 per 13 wk gestation) who presented complaining of contractions that began early yesterday morning. States she initially believed they were Artem Campos ctx but they have become more intense and more frequent. She was seen by her primary OB yesterday morning where she was noted to be 3cm dilated, per patient. She was sent to L&D at her hospital at which time she was noted to be 4/50/-1 and ken regularly on toco. OSH started magnesium bolus and initiated transfer to [facility]. She received BMZ series 3/7, 3/8.   This IUP is complicated by h/o PTD x 2 (34w2d, 36w1d) on weekly progesterone and hypothyroidism (no medication).      Interval History: No acute events overnight.  Patient is resting comfortably this AM.  Reports only occasional contractions.  Good FM.  No LOF nor VB.  Tolerating a PO diet.    Review of patient's allergies indicates:   Allergen Reactions    Chlorhexidine Rash    Latex, natural rubber Rash    Morphine Rash       Objective:     Vital Signs (Most Recent):  Temp: 98.1 °F (36.7 °C) (17 0346)  Pulse: 91 (17 034)  Resp: 18 (17 034)  BP: (!) 99/54 (17 0347)  SpO2: 97 % (17 0347) Vital Signs (24h Range):  Temp:  [97 °F (36.1 °C)-98.1 °F (36.7 °C)] 98.1 °F (36.7 °C)  Pulse:  [] 91  Resp:  [16-18] 18  SpO2:  [96 %-100 %] 97 %  BP: ()/(51-62) 99/54       Intake/Output Summary (Last 24 hours) at 17 0708  Last data filed at 17 0600   Gross per 24 hour   Intake             1080 ml   Output              650 ml   Net              430 ml       Physical  Exam:   Constitutional: She appears well-developed and well-nourished.    HENT:   Head: Normocephalic and atraumatic.    Eyes: Conjunctivae are normal.     Cardiovascular: Normal heart sounds.     Pulmonary/Chest: Effort normal.        Abdominal: Soft. She exhibits no distension and no mass. There is no tenderness. There is no rebound and no guarding. No hernia.                  Skin: Skin is warm and dry.        FHT/NST: 145, mod btbv, +accels/-decels Cat 1 (reassuring)                 TOCO: none       Significant Labs: No results found for this or any previous visit (from the past 12 hour(s)).       Assessment/Plan:   34w4d weeks gestation presents for:    Prior  labor in third trimester, antepartum  -S/p nabeel yesterday    Threatened  labor  -No ctx yesterday nor this AM.    -S/p BMZ 3/7-3/8  -Will consult  for possible housing in the city due to patient's history of rapid  delivery once SROM occurs      Joey Yun MD  Maternal & Fetal Medicine  [facility]

## 2017-03-16 NOTE — PLAN OF CARE
Met with pt today after consult ordered for lodging resources post d/c.    Pt is concerned that she lives approx 1.5 hrs away from the hospital and has a h/o of delivering very quickly. Pt is in the hospital and feels comfortable being here despite not being able to see her  or children frequently. Pt aware that insurance may not continue to cover an extended stay. Although current insurance coverage is to Sunday, March 19. In the event pt is d/c'd, she reported she is unable to afford both the [facility] and [facility]. Pt is unsure that she feels safe staying at [facility] alone. Pt also aware that these are the only local resources to offer. Pt encouraged to discuss these options with her  to devise a plan should she be d/c'd prior to delivery. Pt also encouraged to contact her insurance to see if lodging resources could be covered through her insurance. Emotional support was provided throughout visit. OB resident notified.    Will cont to f/u.     Courtney Lafont, LCSW [facility]  [email or web address]    (phone) [phone] or  Ext. [phone]  (fax) [phone]

## 2017-03-16 NOTE — SUBJECTIVE & OBJECTIVE
Interval History: No acute events overnight.  Patient is resting comfortably this AM.  Reports only occasional contractions.  Good FM.  No LOF nor VB.  Tolerating a PO diet.    Review of patient's allergies indicates:   Allergen Reactions    Chlorhexidine Rash    Latex, natural rubber Rash    Morphine Rash       Objective:     Vital Signs (Most Recent):  Temp: 98.1 °F (36.7 °C) (03/16/17 0346)  Pulse: 91 (03/16/17 0347)  Resp: 18 (03/16/17 0346)  BP: (!) 99/54 (03/16/17 0347)  SpO2: 97 % (03/16/17 0347) Vital Signs (24h Range):  Temp:  [97 °F (36.1 °C)-98.1 °F (36.7 °C)] 98.1 °F (36.7 °C)  Pulse:  [] 91  Resp:  [16-18] 18  SpO2:  [96 %-100 %] 97 %  BP: ()/(51-62) 99/54       Intake/Output Summary (Last 24 hours) at 03/16/17 0708  Last data filed at 03/16/17 0600   Gross per 24 hour   Intake             1080 ml   Output              650 ml   Net              430 ml       Physical Exam:   Constitutional: She appears well-developed and well-nourished.    HENT:   Head: Normocephalic and atraumatic.    Eyes: Conjunctivae are normal.     Cardiovascular: Normal heart sounds.     Pulmonary/Chest: Effort normal.        Abdominal: Soft. She exhibits no distension and no mass. There is no tenderness. There is no rebound and no guarding. No hernia.                  Skin: Skin is warm and dry.        FHT/NST: 145, mod btbv, +accels/-decels Cat 1 (reassuring)                 TOCO: none       Significant Labs: No results found for this or any previous visit (from the past 12 hour(s)).

## 2017-03-16 NOTE — ASSESSMENT & PLAN NOTE
-No ctx yesterday nor this AM.    -S/p KATZ 3/7-3/8  -Will consult SW for possible housing in the city due to patient's history of rapid  delivery once SROM occurs

## 2017-03-17 LAB
ABO + RH BLD: NORMAL
BLD GP AB SCN CELLS X3 SERPL QL: NORMAL

## 2017-03-17 PROCEDURE — 86900 BLOOD TYPING SEROLOGIC ABO: CPT

## 2017-03-17 PROCEDURE — 99233 SBSQ HOSP IP/OBS HIGH 50: CPT | Mod: ,,, | Performed by: OBSTETRICS & GYNECOLOGY

## 2017-03-17 PROCEDURE — 36415 COLL VENOUS BLD VENIPUNCTURE: CPT

## 2017-03-17 PROCEDURE — 11000001 HC ACUTE MED/SURG PRIVATE ROOM

## 2017-03-17 PROCEDURE — 86901 BLOOD TYPING SEROLOGIC RH(D): CPT

## 2017-03-17 PROCEDURE — 25000003 PHARM REV CODE 250: Performed by: STUDENT IN AN ORGANIZED HEALTH CARE EDUCATION/TRAINING PROGRAM

## 2017-03-17 RX ADMIN — Medication 1 EACH: at 09:03

## 2017-03-17 NOTE — PLAN OF CARE
Called pt to confirm that current insurance approval is to Sunday, March 19. Pt verbalized understanding. If pt has to d/c prior to delivery, her current plan is to discharge to home.     Will re-assess on Monday. Will cont to f/u.      Dilcia Long LCSW    [facility]  [email or web address]    (phone) [phone] or  Ext. [phone]  (fax) [phone]

## 2017-03-17 NOTE — PLAN OF CARE
Problem: Patient Care Overview  Goal: Plan of Care Review  Vital signs stable, afebrile, NST x 2 completed; patient denies rupture, vaginal bleeding, pain, cramping

## 2017-03-17 NOTE — SUBJECTIVE & OBJECTIVE
Interval History: No acute events overnight.  Patient reports good FM.  She denies ctx, vb and lof.  All questions answered.      Review of patient's allergies indicates:   Allergen Reactions    Chlorhexidine Rash    Latex, natural rubber Rash    Morphine Rash       Objective:     Vital Signs (Most Recent):  Temp: 97.5 °F (36.4 °C) (03/17/17 0606)  Pulse: 88 (03/17/17 0607)  Resp: 17 (03/17/17 0607)  BP: (!) 97/59 (03/17/17 0607)  SpO2: 97 % (03/17/17 0607) Vital Signs (24h Range):  Temp:  [97.2 °F (36.2 °C)-97.9 °F (36.6 °C)] 97.5 °F (36.4 °C)  Pulse:  [] 88  Resp:  [16-18] 17  SpO2:  [97 %-99 %] 97 %  BP: ()/(56-70) 97/59       Intake/Output Summary (Last 24 hours) at 03/17/17 0638  Last data filed at 03/16/17 1900   Gross per 24 hour   Intake             1200 ml   Output              400 ml   Net              800 ml       Physical Exam:   Constitutional: She appears well-developed and well-nourished.    HENT:   Head: Normocephalic and atraumatic.    Eyes: Conjunctivae are normal.     Cardiovascular: Normal heart sounds.     Pulmonary/Chest: Effort normal.        Abdominal: Soft. She exhibits no distension and no mass. There is no tenderness. There is no rebound and no guarding. No hernia.                  Skin: Skin is warm and dry.        FHT/NST: 125, mod btbv, +accels/-decels, reactive, reassuring                  TOCO: One in 20 min       Significant Labs: No results found for this or any previous visit (from the past 12 hour(s)).

## 2017-03-17 NOTE — ASSESSMENT & PLAN NOTE
-Patient only feeling irregular contractions   -S/p BMZ 3/7-3/8  -SW looking into possible housing in the city due to patient's history of rapid  delivery once SROM occurs

## 2017-03-17 NOTE — PLAN OF CARE
"Problem: Patient Care Overview  Goal: Plan of Care Review  Outcome: Ongoing (interventions implemented as appropriate)  Plan of care for the night reviewed with patient. No acute changes overnight. Ms. Oneill was a "do not disturb" from midnight to 0600. She reports mild and irregular Artem Campos contractions on and off throughout her day, but continues to deny painful or regular contractions. She reports active fetal movement. Denies LOF or VB.  at bedside, attentive to patient.       "

## 2017-03-17 NOTE — PROGRESS NOTES
[facility]  Maternal & Fetal Medicine  Progress Note    Patient Name: Abby Oneill  MRN: [id number]  Code Status: Full Code   Admission Date: 3/13/2017  Length of Stay: 4 days  Attending Physician: Mary Colorado MD  Primary Care Provider: [facility]    Subjective:     HPI:  Abby Oneill is a 26 y.o. [id number] admitted at 34w2d gestation (ARVIN = 2017 per 13 wk gestation) who presented complaining of contractions that began early yesterday morning. States she initially believed they were Artem Campos ctx but they have become more intense and more frequent. She was seen by her primary OB yesterday morning where she was noted to be 3cm dilated, per patient. She was sent to L&D at her hospital at which time she was noted to be 4/50/-1 and ken regularly on toco. OSH started magnesium bolus and initiated transfer to [facility]. She received BMZ series 3/7, 3/8.   This IUP is complicated by h/o PTD x 2 (34w2d, 36w1d) on weekly progesterone and hypothyroidism (no medication).      Interval History: No acute events overnight.  Patient reports good FM.  She denies ctx, vb and lof.  All questions answered.      Review of patient's allergies indicates:   Allergen Reactions    Chlorhexidine Rash    Latex, natural rubber Rash    Morphine Rash       Objective:     Vital Signs (Most Recent):  Temp: 97.5 °F (36.4 °C) (17 06)  Pulse: 88 (17 06)  Resp: 17 (17)  BP: (!) 97/59 (17)  SpO2: 97 % (17) Vital Signs (24h Range):  Temp:  [97.2 °F (36.2 °C)-97.9 °F (36.6 °C)] 97.5 °F (36.4 °C)  Pulse:  [] 88  Resp:  [16-18] 17  SpO2:  [97 %-99 %] 97 %  BP: ()/(56-70) 97/59       Intake/Output Summary (Last 24 hours) at 17 06  Last data filed at 17 1900   Gross per 24 hour   Intake             1200 ml   Output              400 ml   Net              800 ml       Physical Exam:   Constitutional: She appears  well-developed and well-nourished.    HENT:   Head: Normocephalic and atraumatic.    Eyes: Conjunctivae are normal.     Cardiovascular: Normal heart sounds.     Pulmonary/Chest: Effort normal.        Abdominal: Soft. She exhibits no distension and no mass. There is no tenderness. There is no rebound and no guarding. No hernia.                  Skin: Skin is warm and dry.        FHT/NST: 125, mod btbv, +accels/-decels, reactive, reassuring                  TOCO: One in 20 min       Significant Labs: No results found for this or any previous visit (from the past 12 hour(s)).       Assessment/Plan:   34w5d weeks gestation presents for:    Prior  labor in third trimester, antepartum  -S/p nabeel    Threatened  labor  -Patient only feeling irregular contractions   -S/p Z 3/7-3/8  -SW looking into possible housing in the city due to patient's history of rapid  delivery once SROM occurs      Joey Yun MD  Maternal & Fetal Medicine  [facility]

## 2017-03-18 PROBLEM — O60.03 PRETERM LABOR IN THIRD TRIMESTER WITHOUT DELIVERY: Status: ACTIVE | Noted: 2017-03-13

## 2017-03-18 PROCEDURE — 59025 FETAL NON-STRESS TEST: CPT

## 2017-03-18 PROCEDURE — 11000001 HC ACUTE MED/SURG PRIVATE ROOM

## 2017-03-18 PROCEDURE — 25000003 PHARM REV CODE 250: Performed by: STUDENT IN AN ORGANIZED HEALTH CARE EDUCATION/TRAINING PROGRAM

## 2017-03-18 RX ADMIN — SODIUM CHLORIDE, SODIUM LACTATE, POTASSIUM CHLORIDE, AND CALCIUM CHLORIDE 1000 ML: .6; .31; .03; .02 INJECTION, SOLUTION INTRAVENOUS at 08:03

## 2017-03-18 RX ADMIN — Medication 1 EACH: at 08:03

## 2017-03-18 NOTE — ASSESSMENT & PLAN NOTE
-Karrie q Monday, next dose 3/20  -History of rapid delivery s/p SROM, will continue to observe closely

## 2017-03-18 NOTE — SUBJECTIVE & OBJECTIVE
Interval History: Patient reports no contractions, active fetal movement, No vaginal bleeding , No loss of fluid    Review of patient's allergies indicates:   Allergen Reactions    Chlorhexidine Rash    Latex, natural rubber Rash    Morphine Rash       Objective:     Vital Signs (Most Recent):  Temp: 96.8 °F (36 °C) (03/18/17 1349)  Pulse: 94 (03/18/17 1349)  Resp: 16 (03/18/17 1349)  BP: 105/66 (03/18/17 1349)  SpO2: 100 % (03/18/17 1349) Vital Signs (24h Range):  Temp:  [96.4 °F (35.8 °C)-97.9 °F (36.6 °C)] 96.8 °F (36 °C)  Pulse:  [] 94  Resp:  [14-16] 16  SpO2:  [98 %-100 %] 100 %  BP: ()/(48-67) 105/66       Intake/Output Summary (Last 24 hours) at 03/18/17 1641  Last data filed at 03/18/17 0600   Gross per 24 hour   Intake              400 ml   Output                0 ml   Net              400 ml       Physical Exam:   Constitutional: She is oriented to person, place, and time. She appears well-developed and well-nourished. No distress.       Cardiovascular: Normal rate, regular rhythm and normal heart sounds.     Pulmonary/Chest: Effort normal and breath sounds normal.        Abdominal: Soft. Bowel sounds are normal. She exhibits distension (gravid uterus palpable, appropriate for gestational age). There is no tenderness. There is no rebound and no guarding.                 Neurological: She is alert and oriented to person, place, and time.     Psychiatric: She has a normal mood and affect.       FHT/NST: 140 Cat 1 (reassuring)                TOCO: Q 20 minutes       Significant Labs: No new results

## 2017-03-18 NOTE — ASSESSMENT & PLAN NOTE
-Patient only feeling irregular contractions   -S/p BMZ 3/7-3/8  -Plan is for admission through 3/20 so long as she remains pregnant. Will recheck cervix in AM.

## 2017-03-18 NOTE — PLAN OF CARE
Problem: Patient Care Overview  Goal: Plan of Care Review  Outcome: Ongoing (interventions implemented as appropriate)  Treatment plan reviewed with pt and . No acute events overnight. VS stable and pt remains afebrile. Pt denies ctx, pain, LOF, or vaginal bleeding. Pt reports +FM. Pt has no questions or concerns at this time. Will continue to monitor.

## 2017-03-18 NOTE — PROGRESS NOTES
[facility]  Maternal & Fetal Medicine  Progress Note    Patient Name: Abby Oneill  MRN: [id number]  Code Status: Full Code   Admission Date: 3/13/2017  Length of Stay: 5 days  Attending Physician: Mary Colorado MD  Primary Care Provider: [facility]    Subjective:     HPI:  Abby Oneill is a 26 y.o. [id number] admitted at 34w2d gestation (ARVIN = 2017 per 13 wk gestation) who presented complaining of contractions that began early yesterday morning. States she initially believed they were Artem Campos ctx but they have become more intense and more frequent. She was seen by her primary OB yesterday morning where she was noted to be 3cm dilated, per patient. She was sent to L&D at her hospital at which time she was noted to be 4/50/-1 and ken regularly on toco. OSH started magnesium bolus and initiated transfer to [facility]. She received BMZ series 3/7, 3/8.   This IUP is complicated by h/o PTD x 2 (34w2d, 36w1d) on weekly progesterone and hypothyroidism (no medication).      Interval History: Patient reports no contractions, active fetal movement, No vaginal bleeding , No loss of fluid    Review of patient's allergies indicates:   Allergen Reactions    Chlorhexidine Rash    Latex, natural rubber Rash    Morphine Rash       Objective:     Vital Signs (Most Recent):  Temp: 96.8 °F (36 °C) (17 1349)  Pulse: 94 (17 1349)  Resp: 16 (17 1349)  BP: 105/66 (17 1349)  SpO2: 100 % (17 1349) Vital Signs (24h Range):  Temp:  [96.4 °F (35.8 °C)-97.9 °F (36.6 °C)] 96.8 °F (36 °C)  Pulse:  [] 94  Resp:  [14-16] 16  SpO2:  [98 %-100 %] 100 %  BP: ()/(48-67) 105/66       Intake/Output Summary (Last 24 hours) at 17 1641  Last data filed at 17 0600   Gross per 24 hour   Intake              400 ml   Output                0 ml   Net              400 ml       Physical Exam:   Constitutional: She is oriented to person, place, and  time. She appears well-developed and well-nourished. No distress.       Cardiovascular: Normal rate, regular rhythm and normal heart sounds.     Pulmonary/Chest: Effort normal and breath sounds normal.        Abdominal: Soft. Bowel sounds are normal. She exhibits distension (gravid uterus palpable, appropriate for gestational age). There is no tenderness. There is no rebound and no guarding.                 Neurological: She is alert and oriented to person, place, and time.     Psychiatric: She has a normal mood and affect.       FHT/NST: 140 Cat 1 (reassuring)                TOCO: Q 20 minutes       Significant Labs: No new results    Assessment/Plan:   34w6d weeks gestation presents for:    *  labor in third trimester without delivery  -Patient only feeling irregular contractions   -S/p BMZ 3/7-3/8  -Plan is for admission through 3/20 so long as she remains pregnant. Will recheck cervix in AM.    Prior  labor in third trimester, antepartum  -Karrie q Monday, next dose 3/20  -History of rapid delivery s/p SROM, will continue to observe closely      Clifton Dennis Iii, MD  Maternal & Fetal Medicine  [facility]

## 2017-03-18 NOTE — PROGRESS NOTES
Patient desires to go walking. VS stable at this time. Patient denies contractions at this time. Patient instructed to come back to unit if she begins to feel contractions, LOF, or anything out of the ordinary. Patient verbalized understanding. Patient instructed to let nursing staff know when she and her  return to the room.

## 2017-03-19 PROCEDURE — 86592 SYPHILIS TEST NON-TREP QUAL: CPT

## 2017-03-19 PROCEDURE — 11000001 HC ACUTE MED/SURG PRIVATE ROOM

## 2017-03-19 PROCEDURE — 86703 HIV-1/HIV-2 1 RESULT ANTBDY: CPT

## 2017-03-19 PROCEDURE — 36415 COLL VENOUS BLD VENIPUNCTURE: CPT

## 2017-03-19 PROCEDURE — 25000003 PHARM REV CODE 250: Performed by: STUDENT IN AN ORGANIZED HEALTH CARE EDUCATION/TRAINING PROGRAM

## 2017-03-19 RX ADMIN — Medication 1 EACH: at 08:03

## 2017-03-19 NOTE — PLAN OF CARE
Problem: Patient Care Overview  Goal: Plan of Care Review  Vital signs stable, afebrile; denied any pain until this pm--c/o increased cramping, possible rupture; MD exam--intact, SVE unchanged; will continue to monitor

## 2017-03-19 NOTE — PROGRESS NOTES
LABOR PROGRESS NOTE    Patient was moved to L&D at midnight due to regular, painful contractions and cervical change.    S:  MD to bedside for cervical check. Complaints: Yes - pain    O: Temp:  [96.4 °F (35.8 °C)-97.9 °F (36.6 °C)] 96.4 °F (35.8 °C)  Pulse:  [] 95  Resp:  [14-17] 17  SpO2:  [97 %-100 %] 98 %  BP: ()/(48-70) 101/67      FHT: 130 BPM/moderate beat to beat variability/+ accels/no decels Cat 1 (reassuring)  CTX: q 4-5 minutes  SVE: /-2        ASSESSMENT:   26 y.o.  at 35w0d,  labor    FHT reassuring    Active Hospital Problems    Diagnosis  POA    * labor in third trimester without delivery [O60.03]  Yes    Prior  labor in third trimester, antepartum [O09.213]  Not Applicable      Resolved Hospital Problems    Diagnosis Date Resolved POA   No resolved problems to display.         PLAN:    Labor management  Continue Close Maternal/Fetal Monitoring.   Continue expectant management  Recheck 4 hours or PRN      João Hall MD

## 2017-03-19 NOTE — PROGRESS NOTES
[facility]  Maternal & Fetal Medicine  Progress Note    Patient Name: Abby Oneill  MRN: [id number]  Code Status: Full Code   Admission Date: 3/13/2017  Length of Stay: 6 days  Attending Physician: Mary Colorado MD  Primary Care Provider: [facility]    Subjective:     HPI:  Abby Oneill is a 26 y.o. [id number] admitted at 34w2d gestation (ARVIN = 2017 per 13 wk gestation) who presented complaining of contractions that began early yesterday morning. States she initially believed they were [place] ctx but they have become more intense and more frequent. She was seen by her primary OB yesterday morning where she was noted to be 3cm dilated, per patient. She was sent to L&D at her hospital at which time she was noted to be 4/50/-1 and ken regularly on toco. OSH started magnesium bolus and initiated transfer to [facility]. She received BMZ series 3/7, 3/8.   This IUP is complicated by h/o PTD x 2 (34w2d, 36w1d) on weekly progesterone and hypothyroidism (no medication).      Interval History: This morning patient continues to report contractions that are more infrequent than overnight. Patient reports active fetal movement, No vaginal bleeding , No loss of fluid.     Review of patient's allergies indicates:   Allergen Reactions    Chlorhexidine Rash    Latex, natural rubber Rash    Morphine Rash       Objective:     Vital Signs (Most Recent):  Temp: 97.3 °F (36.3 °C) (17 1424)  Pulse: (!) 111 (17 1433)  Resp: 17 (17 0701)  BP: 104/65 (17 1358)  SpO2: 97 % (17 1433) Vital Signs (24h Range):  Temp:  [96.4 °F (35.8 °C)-97.5 °F (36.4 °C)] 97.3 °F (36.3 °C)  Pulse:  [] 111  Resp:  [17] 17  SpO2:  [95 %-100 %] 97 %  BP: ()/(51-76) 104/65       Intake/Output Summary (Last 24 hours) at 17 1455  Last data filed at 17 0700   Gross per 24 hour   Intake             1600 ml   Output              600 ml   Net              1000 ml       Physical Exam:   Constitutional: She is oriented to person, place, and time. She appears well-developed and well-nourished.       Cardiovascular: Normal rate and regular rhythm.     Pulmonary/Chest: Effort normal and breath sounds normal.        Abdominal: Soft. She exhibits no distension. There is no tenderness.   Gravid     Genitourinary:   Genitourinary Comments: SVE: 6/70/-2 at 0930           Musculoskeletal: Normal range of motion.       Neurological: She is alert and oriented to person, place, and time.    Skin: Skin is warm.        FHT/NST: 135bpm Cat 1 (reassuring), mod BTBV, +accels, no decels                 TOCO: Q 5-10 minutes       Significant Labs: None    Assessment/Plan:   35w0d weeks gestation presents for:    *  labor in third trimester without delivery  -Patient only feeling irregular contractions   -S/p BMZ 3/7-3/8  -keep on L&D  -no cervical change this AM  -will allow patient to eat a light diet and continue to monitor    Prior  labor in third trimester, antepartum  -[place] q Monday, next dose 3/20  -History of rapid delivery s/p SROM, will continue to observe closely        Nora Bateman MD  Maternal & Fetal Medicine  [facility]

## 2017-03-19 NOTE — ASSESSMENT & PLAN NOTE
-Patient only feeling irregular contractions   -S/p BMZ 3/7-3/8  -keep on L&D  -no cervical change this AM  -will allow patient to eat a light diet and continue to monitor

## 2017-03-19 NOTE — SUBJECTIVE & OBJECTIVE
Interval History: This morning patient continues to report contractions that are more infrequent than overnight. Patient reports active fetal movement, No vaginal bleeding , No loss of fluid.     Review of patient's allergies indicates:   Allergen Reactions    Chlorhexidine Rash    Latex, natural rubber Rash    Morphine Rash       Objective:     Vital Signs (Most Recent):  Temp: 97.3 °F (36.3 °C) (03/19/17 1424)  Pulse: (!) 111 (03/19/17 1433)  Resp: 17 (03/19/17 0701)  BP: 104/65 (03/19/17 1358)  SpO2: 97 % (03/19/17 1433) Vital Signs (24h Range):  Temp:  [96.4 °F (35.8 °C)-97.5 °F (36.4 °C)] 97.3 °F (36.3 °C)  Pulse:  [] 111  Resp:  [17] 17  SpO2:  [95 %-100 %] 97 %  BP: ()/(51-76) 104/65       Intake/Output Summary (Last 24 hours) at 03/19/17 1455  Last data filed at 03/19/17 0700   Gross per 24 hour   Intake             1600 ml   Output              600 ml   Net             1000 ml       Physical Exam:   Constitutional: She is oriented to person, place, and time. She appears well-developed and well-nourished.       Cardiovascular: Normal rate and regular rhythm.     Pulmonary/Chest: Effort normal and breath sounds normal.        Abdominal: Soft. She exhibits no distension. There is no tenderness.   Gravid     Genitourinary:   Genitourinary Comments: SVE: 6/70/-2 at 0930           Musculoskeletal: Normal range of motion.       Neurological: She is alert and oriented to person, place, and time.    Skin: Skin is warm.        FHT/NST: 135bpm Cat 1 (reassuring), mod BTBV, +accels, no decels                 TOCO: Q 5-10 minutes       Significant Labs: None

## 2017-03-19 NOTE — PROGRESS NOTES
LABOR PROGRESS NOTE    To bedside to assess patient. She reports contractions much less frequent and much less intense. Rates discomfort 2/10.    S:  MD to bedside for cervical check. Complaints: Yes - pain    O: Temp:  [96.4 °F (35.8 °C)-97.5 °F (36.4 °C)] 97.3 °F (36.3 °C)  Pulse:  [] 90  Resp:  [17] 17  SpO2:  [95 %-100 %] 98 %  BP: ()/(51-76) 100/65      FHT: 130 BPM/moderate beat to beat variability/+ accels/no decels Cat 1 (reassuring)  CTX: q 10-15 minutes  SVE: 6/70/-2, posterior, unchanged from previous exams     ASSESSMENT:   26 y.o.  at 35w0d,  labor    FHT reassuring    Active Hospital Problems    Diagnosis  POA    * labor in third trimester without delivery [O60.03]  Yes    Prior  labor in third trimester, antepartum [O09.213]  Not Applicable      Resolved Hospital Problems    Diagnosis Date Resolved POA   No resolved problems to display.         PLAN:    Labor management  Continue Close Maternal/Fetal Monitoring.   Continue expectant management.  Patient to remain on continuous monitoring and on L&D overnight.  OK to shower  Regular diet for now.  Recheck if patient with increased discomfort.  De-escalate to antepartum unit in the morning if stable.    Casie Conde MD  OB/GYN, PGY-1

## 2017-03-20 LAB
HIV 1+2 AB+HIV1 P24 AG SERPL QL IA: NEGATIVE
RPR SER QL: NORMAL

## 2017-03-20 PROCEDURE — 25000003 PHARM REV CODE 250: Performed by: STUDENT IN AN ORGANIZED HEALTH CARE EDUCATION/TRAINING PROGRAM

## 2017-03-20 PROCEDURE — 63600175 PHARM REV CODE 636 W HCPCS: Performed by: OBSTETRICS & GYNECOLOGY

## 2017-03-20 PROCEDURE — 59025 FETAL NON-STRESS TEST: CPT | Mod: 26,,, | Performed by: OBSTETRICS & GYNECOLOGY

## 2017-03-20 PROCEDURE — 11000001 HC ACUTE MED/SURG PRIVATE ROOM

## 2017-03-20 PROCEDURE — 99233 SBSQ HOSP IP/OBS HIGH 50: CPT | Mod: 25,,, | Performed by: OBSTETRICS & GYNECOLOGY

## 2017-03-20 RX ADMIN — Medication 1 EACH: at 09:03

## 2017-03-20 RX ADMIN — HYDROXYPROGESTERONE CAPROATE 250 MG: 250 INJECTION INTRAMUSCULAR at 09:03

## 2017-03-20 NOTE — PROGRESS NOTES
Labor Note    S: 26 y.o.  at 35w1d,  labor in third trimester without delivery, expectant mgmt. Contractions subsided.     O:   Temp:  [96.8 °F (36 °C)-98.2 °F (36.8 °C)] 97.5 °F (36.4 °C)  Pulse:  [] 87  Resp:  [17-18] 18  SpO2:  [95 %-100 %] 97 %  BP: ()/(53-79) 94/60    FHT: Cat 1, 130 FHT, mod variability, pos accelerations  [place]/IUPC: No regular pattern  SVE: /-3      ASSESSMENT: 26 y.o.  at 35w1d with  labor and advanced cervical dilation, contractions now subsided.    PLAN:  Prior  labor in third trimester, antepartum  -[place] due today     Threatened  labor  - Contractions have stalled out, will transfer to ante  - Continue to monitor  -S/p BMZ 3/7-3/8      Clifton Dennis Iii     I have reviewed resident note including assessment and plan  I have seen patient. She has taken a shower, reports feeling good, good fetal movement, contractions of spaced   Plan: will continue with in hospital management with NST bid and more frequent as indicated.

## 2017-03-21 VITALS
BODY MASS INDEX: 25.29 KG/M2 | WEIGHT: 148.13 LBS | HEART RATE: 107 BPM | DIASTOLIC BLOOD PRESSURE: 61 MMHG | SYSTOLIC BLOOD PRESSURE: 100 MMHG | TEMPERATURE: 97 F | RESPIRATION RATE: 18 BRPM | OXYGEN SATURATION: 96 % | HEIGHT: 64 IN

## 2017-03-21 PROCEDURE — 25000003 PHARM REV CODE 250: Performed by: STUDENT IN AN ORGANIZED HEALTH CARE EDUCATION/TRAINING PROGRAM

## 2017-03-21 PROCEDURE — 99239 HOSP IP/OBS DSCHRG MGMT >30: CPT | Mod: ,,, | Performed by: OBSTETRICS & GYNECOLOGY

## 2017-03-21 RX ADMIN — Medication 1 EACH: at 09:03

## 2017-03-21 NOTE — ASSESSMENT & PLAN NOTE
-Karrie perry Monday  -History of rapid delivery s/p SROM, will continue to observe closely while admitted  -Will discuss possible discharge today

## 2017-03-21 NOTE — PLAN OF CARE
Problem: Patient Care Overview  Goal: Plan of Care Review  Outcome: Ongoing (interventions implemented as appropriate)  Pt doing well, no acute changes during this shift, vital signs stable, no signs of distress, pt report positive fetal movement, pt denies contraction, vaginal bleeding, and LOF.

## 2017-03-21 NOTE — ASSESSMENT & PLAN NOTE
-Karrie perry Monday  -History of rapid delivery s/p SROM, will continue to observe closely while admitted  -Will plan to discharge home today with work restrictions and strict  labor precautions

## 2017-03-21 NOTE — DISCHARGE SUMMARY
[facility]  Maternal & Fetal Medicine  Discharge Summary      Patient Name: Abby Oneill  MRN: [id number]  Admission Date: 3/13/2017  Hospital Length of Stay: 8 days  Discharge Date and Time:  2017 9:14 AM  Attending Physician: Mary Colorado MD   Discharging Provider: Joey Yun MD  Primary Care Provider: [facility]    HPI: Abby Oneill is a 26 y.o. [id number] admitted at 34w2d gestation (ARVIN = 2017 per 13 wk gestation) who presented complaining of contractions that began early yesterday morning. States she initially believed they were [place] ctx but they have become more intense and more frequent. She was seen by her primary OB yesterday morning where she was noted to be 3cm dilated, per patient. She was sent to L&D at her hospital at which time she was noted to be 4/50/-1 and ken regularly on toco. OSH started magnesium bolus and initiated transfer to [facility]. She received BMZ series 3/7, 3/8.   This IUP is complicated by h/o PTD x 2 (34w2d, 36w1d) on weekly progesterone and hypothyroidism (no medication).      * No surgery found *      Hospital Course: 3/14/17  -S/p BMZ 3/7-3/8  -/-2 x 2 exams  -Deescalated  3/15/17  -No issues  3/16/17  -No issues overnight  3/17/17  -No issues overnight  -Looking into housing options closer to the hospital  3/18/17  -No issues overnight  3/19/17  - Removed to L&D overnight due to regular ctx and cervical change from 5 to 6cm  3/20/17  -Patient moved back to antepartum.  No further ctx  3/21/17  -No acute issues overnight.  Will plan to discharge home today      Consults:   Consults         Status Ordering Provider     Inpatient consult to Social Work  Once     Provider:  (Not yet assigned)    EVENS Fernando          Pertinant Diagnostic Studies:    CBC  Lab Results   Component Value Date    WBC 15.67 (H) 2017    HGB 10.9 (L) 2017    HCT 34.2 (L) 2017    MCV 91 2017      2017               Pending Diagnostic Studies:     None        Final Active Diagnoses:    Diagnosis Date Noted POA    PRINCIPAL PROBLEM:   labor in third trimester without delivery [O60.03] 2017 Yes    Prior  labor in third trimester, antepartum [O09.213] 10/10/2016 Not Applicable      Problems Resolved During this Admission:    Diagnosis Date Noted Date Resolved POA      Discharged Condition: good    Disposition: Home or Self Care    Follow Up:  Follow-up Information     Follow up with [facility]. Schedule an appointment as soon as possible for a visit in 1 week.    Why:  Prenatal appointments    Contact information:    [address]  [facility] [place] [address]  [phone]          Patient Instructions:     Diet general     Activity as tolerated     Call MD for:  temperature >100.4     Call MD for:  persistent nausea and vomiting or diarrhea     Call MD for:  severe uncontrolled pain     Call MD for:  redness, tenderness, or signs of infection (pain, swelling, redness, odor or green/yellow discharge around incision site)     Call MD for:  difficulty breathing or increased cough     Call MD for:  severe persistent headache     Call MD for:  persistent dizziness, light-headedness, or visual disturbances       Medications:  Reconciled Home Medications:   Current Discharge Medication List      CONTINUE these medications which have NOT CHANGED    Details   ondansetron (ZOFRAN) 4 MG tablet       PNV95/FERROUS FUMARATE/FA (PRENATAL MULTIVITAMINS ORAL) Take 1 capsule by mouth.      UNISOM, DOXYLAMINE, 25 mg tablet Take 1 capsule by mouth daily as needed.      VITAMIN B-6 50 MG tablet            Time spent on the discharge of patient: 45 minutes    Joey Yun MD  Maternal & Fetal Medicine  [facility]

## 2017-03-21 NOTE — PROGRESS NOTES
Patient stable with no complaints, do not disturb order from midnight till 6a. Discussed with patient when to call if anything changes from her norm.

## 2017-03-21 NOTE — PROGRESS NOTES
NST done and Reactive.  Pt given discharge instructions verbally and written per Dr Hopper.  All discharge instructions reviewed with pt per nurse.  Pt will have follow up care at [facility] [place] in [place]

## 2017-03-21 NOTE — SUBJECTIVE & OBJECTIVE
Interval History: No acute events overnight.  Patient reports good FM.  She denies ctx, vb and lof.  All questions answered.      Review of patient's allergies indicates:   Allergen Reactions    Chlorhexidine Rash    Latex, natural rubber Rash    Morphine Rash       Objective:     Vital Signs (Most Recent):  Temp: 96.6 °F (35.9 °C) (03/21/17 0615)  Pulse: 91 (03/21/17 0616)  Resp: 18 (03/20/17 1113)  BP: 106/70 (03/21/17 0615)  SpO2: 95 % (03/21/17 0616) Vital Signs (24h Range):  Temp:  [96.4 °F (35.8 °C)-98.4 °F (36.9 °C)] 96.6 °F (35.9 °C)  Pulse:  [] 91  Resp:  [18] 18  SpO2:  [95 %-98 %] 95 %  BP: ()/(54-70) 106/70       Intake/Output Summary (Last 24 hours) at 03/21/17 0637  Last data filed at 03/20/17 0700   Gross per 24 hour   Intake              500 ml   Output                0 ml   Net              500 ml       Physical Exam:   Constitutional: She appears well-developed and well-nourished.    HENT:   Head: Normocephalic and atraumatic.    Eyes: Conjunctivae are normal.     Cardiovascular: Normal heart sounds.     Pulmonary/Chest: Effort normal.        Abdominal: Soft. She exhibits no distension and no mass. There is no tenderness. There is no rebound and no guarding. No hernia.                  Skin: Skin is warm and dry.        FHT/NST: 130, mod btbv, +accels/-decels, reactive, reassuring                 TOCO: none       Significant Labs: No results found for this or any previous visit (from the past 12 hour(s)).

## 2017-06-19 PROBLEM — O60.03 PRETERM LABOR IN THIRD TRIMESTER WITHOUT DELIVERY: Status: RESOLVED | Noted: 2017-03-13 | Resolved: 2017-06-19

## 2024-02-27 NOTE — PROGRESS NOTES
[facility]  Maternal & Fetal Medicine  Progress Note    Patient Name: Abby Oneill  MRN: [id number]  Code Status: Full Code   Admission Date: 3/13/2017  Length of Stay: 8 days  Attending Physician: Mary Colorado MD  Primary Care Provider: [facility]    Subjective:     HPI:  Abby Oneill is a 26 y.o. [id number] admitted at 34w2d gestation (ARVIN = 2017 per 13 wk gestation) who presented complaining of contractions that began early yesterday morning. States she initially believed they were [place] ctx but they have become more intense and more frequent. She was seen by her primary OB yesterday morning where she was noted to be 3cm dilated, per patient. She was sent to L&D at her hospital at which time she was noted to be 4/50/-1 and ken regularly on toco. OSH started magnesium bolus and initiated transfer to [facility]. She received BMZ series 3/7, 3/8.   This IUP is complicated by h/o PTD x 2 (34w2d, 36w1d) on weekly progesterone and hypothyroidism (no medication).      Interval History: No acute events overnight.  Patient reports good FM.  She denies ctx, vb and lof.  All questions answered.      Review of patient's allergies indicates:   Allergen Reactions    Chlorhexidine Rash    Latex, natural rubber Rash    Morphine Rash       Objective:     Vital Signs (Most Recent):  Temp: 96.6 °F (35.9 °C) (17 0615)  Pulse: 91 (17 0616)  Resp: 18 (17 1113)  BP: 106/70 (17 0615)  SpO2: 95 % (17 0616) Vital Signs (24h Range):  Temp:  [96.4 °F (35.8 °C)-98.4 °F (36.9 °C)] 96.6 °F (35.9 °C)  Pulse:  [] 91  Resp:  [18] 18  SpO2:  [95 %-98 %] 95 %  BP: ()/(54-70) 106/70       Intake/Output Summary (Last 24 hours) at 17 0637  Last data filed at 17 0700   Gross per 24 hour   Intake              500 ml   Output                0 ml   Net              500 ml       Physical Exam:   Constitutional: She appears well-developed  and well-nourished.    HENT:   Head: Normocephalic and atraumatic.    Eyes: Conjunctivae are normal.     Cardiovascular: Normal heart sounds.     Pulmonary/Chest: Effort normal.        Abdominal: Soft. She exhibits no distension and no mass. There is no tenderness. There is no rebound and no guarding. No hernia.                  Skin: Skin is warm and dry.        FHT/NST: 130, mod btbv, +accels/-decels, reactive, reassuring                 TOCO: none       Significant Labs: No results found for this or any previous visit (from the past 12 hour(s)).       Assessment/Plan:   35w2d weeks gestation presents for:    *  labor in third trimester without delivery  -No further ctx overnight  -S/p BMZ 3/7-3/8  -NST BID    Prior  labor in third trimester, antepartum  -Kar[place] q Monday  -History of rapid delivery s/p SROM, will continue to observe closely while admitted  -Will discuss possible discharge today         Joey Yun MD  Maternal & Fetal Medicine  [facility]       Unknown